# Patient Record
Sex: MALE
[De-identification: names, ages, dates, MRNs, and addresses within clinical notes are randomized per-mention and may not be internally consistent; named-entity substitution may affect disease eponyms.]

---

## 2018-08-27 ENCOUNTER — APPOINTMENT (OUTPATIENT)
Dept: ENDOCRINOLOGY | Facility: CLINIC | Age: 48
End: 2018-08-27
Payer: MEDICAID

## 2018-08-27 VITALS
BODY MASS INDEX: 26.68 KG/M2 | DIASTOLIC BLOOD PRESSURE: 80 MMHG | WEIGHT: 197 LBS | SYSTOLIC BLOOD PRESSURE: 120 MMHG | HEART RATE: 67 BPM | HEIGHT: 72 IN

## 2018-08-27 PROBLEM — Z00.00 ENCOUNTER FOR PREVENTIVE HEALTH EXAMINATION: Status: ACTIVE | Noted: 2018-08-27

## 2018-08-27 PROCEDURE — 99203 OFFICE O/P NEW LOW 30 MIN: CPT

## 2018-10-25 ENCOUNTER — APPOINTMENT (OUTPATIENT)
Dept: HEART AND VASCULAR | Facility: CLINIC | Age: 48
End: 2018-10-25

## 2018-11-08 ENCOUNTER — APPOINTMENT (OUTPATIENT)
Dept: HEART AND VASCULAR | Facility: CLINIC | Age: 48
End: 2018-11-08

## 2018-11-26 ENCOUNTER — APPOINTMENT (OUTPATIENT)
Dept: ENDOCRINOLOGY | Facility: CLINIC | Age: 48
End: 2018-11-26

## 2018-11-29 ENCOUNTER — APPOINTMENT (OUTPATIENT)
Dept: HEART AND VASCULAR | Facility: CLINIC | Age: 48
End: 2018-11-29
Payer: MEDICAID

## 2018-11-29 VITALS
SYSTOLIC BLOOD PRESSURE: 100 MMHG | WEIGHT: 205 LBS | HEIGHT: 72 IN | DIASTOLIC BLOOD PRESSURE: 70 MMHG | RESPIRATION RATE: 16 BRPM | BODY MASS INDEX: 27.77 KG/M2 | HEART RATE: 70 BPM

## 2018-11-29 PROCEDURE — 99213 OFFICE O/P EST LOW 20 MIN: CPT

## 2018-11-29 NOTE — HISTORY OF PRESENT ILLNESS
[FreeTextEntry1] : Patient is 48 year old male without medical history who was seen today for follow up. Previously complained of intermittent episodes of LE edema. Currently reports no LE edema. Remains very active, goes to gym without any chest pain/shortness of breath. No dizziness/palpitations/LOC.

## 2018-11-29 NOTE — PHYSICAL EXAM
[General Appearance - Well Developed] : well developed [Normal Appearance] : normal appearance [Well Groomed] : well groomed [General Appearance - Well Nourished] : well nourished [No Deformities] : no deformities [General Appearance - In No Acute Distress] : no acute distress [Normal Conjunctiva] : the conjunctiva exhibited no abnormalities [Eyelids - No Xanthelasma] : the eyelids demonstrated no xanthelasmas [Normal Oral Mucosa] : normal oral mucosa [No Oral Pallor] : no oral pallor [No Oral Cyanosis] : no oral cyanosis [Normal Jugular Venous A Waves Present] : normal jugular venous A waves present [Normal Jugular Venous V Waves Present] : normal jugular venous V waves present [No Jugular Venous Hogan A Waves] : no jugular venous hogan A waves [Respiration, Rhythm And Depth] : normal respiratory rhythm and effort [Exaggerated Use Of Accessory Muscles For Inspiration] : no accessory muscle use [Auscultation Breath Sounds / Voice Sounds] : lungs were clear to auscultation bilaterally [Heart Rate And Rhythm] : heart rate and rhythm were normal [Heart Sounds] : normal S1 and S2 [Murmurs] : no murmurs present [Abdomen Soft] : soft [Abdomen Tenderness] : non-tender [Abdomen Mass (___ Cm)] : no abdominal mass palpated [Abnormal Walk] : normal gait [Gait - Sufficient For Exercise Testing] : the gait was sufficient for exercise testing [Nail Clubbing] : no clubbing of the fingernails [Cyanosis, Localized] : no localized cyanosis [Petechial Hemorrhages (___cm)] : no petechial hemorrhages [Skin Color & Pigmentation] : normal skin color and pigmentation [] : no rash [No Venous Stasis] : no venous stasis [Skin Lesions] : no skin lesions [No Skin Ulcers] : no skin ulcer [No Xanthoma] : no  xanthoma was observed [Oriented To Time, Place, And Person] : oriented to person, place, and time [Affect] : the affect was normal [Mood] : the mood was normal [No Anxiety] : not feeling anxious

## 2018-11-29 NOTE — DISCUSSION/SUMMARY
[FreeTextEntry1] : Assessment/Plan:\par \par Patient is 48 year old male without medical history who was seen today for follow up;\par \par -Doing well, without anginal equivalents at rest or exertion\par -No LE edema noted\par -ECHO: ef normal, Venous doppler: neg for DVT, + superficial venous insuff\par -BP controlled\par \par Cardiac status stable

## 2018-12-03 ENCOUNTER — APPOINTMENT (OUTPATIENT)
Dept: ENDOCRINOLOGY | Facility: CLINIC | Age: 48
End: 2018-12-03

## 2018-12-10 ENCOUNTER — APPOINTMENT (OUTPATIENT)
Dept: ENDOCRINOLOGY | Facility: CLINIC | Age: 48
End: 2018-12-10

## 2018-12-14 ENCOUNTER — APPOINTMENT (OUTPATIENT)
Dept: ENDOCRINOLOGY | Facility: CLINIC | Age: 48
End: 2018-12-14

## 2019-02-11 ENCOUNTER — APPOINTMENT (OUTPATIENT)
Dept: ENDOCRINOLOGY | Facility: CLINIC | Age: 49
End: 2019-02-11

## 2019-04-01 ENCOUNTER — APPOINTMENT (OUTPATIENT)
Dept: ENDOCRINOLOGY | Facility: CLINIC | Age: 49
End: 2019-04-01
Payer: SELF-PAY

## 2019-04-01 DIAGNOSIS — E04.1 NONTOXIC SINGLE THYROID NODULE: ICD-10-CM

## 2019-04-01 PROCEDURE — 99214 OFFICE O/P EST MOD 30 MIN: CPT

## 2019-04-01 NOTE — HISTORY OF PRESENT ILLNESS
[FreeTextEntry1] : 8/2018 tt4 low normal at 4.4, other labs wnl.\par \par 4/2019: Here for /fu, generally feels well and endorses no acute complaints. No interval events since LV. Today reports no new complaints. completed US and thyroid labs which show normal TFTs and no nodules. he otherwise denies any f/c, CP, SOB, palpitations, tremors, depressed mood, anxiety, palpitations, n/v, stool/urinary abn, skin/weight changes, heat/cold intolerance, HAs, breast/nipple changes, polyuria/polydipsia/nocturia or other complaints. He again denies any dysphagia, hoarseness, neck tenderness or new palpable masses. He again denies any family history of thyroid disorders or personal exposure to ionizing radiation.\par reports chronic low libido, is worried about low testosterone, reports that PM testosterone was 230 in the past. \par

## 2019-04-01 NOTE — ASSESSMENT
[FreeTextEntry1] : Thyroid complaints\par no nodule identified, 11/2018 TFTs wnl, complete w/u w/ TPO Ab measurement prior to NV\par \par Low libido\par appears appropriately virilized, check HPG axis at 8 AM. Verbalized understanding and agrees with treatment plan, will contact MD and seek emergency medical care if condition changes.\par

## 2019-06-10 ENCOUNTER — APPOINTMENT (OUTPATIENT)
Dept: ENDOCRINOLOGY | Facility: CLINIC | Age: 49
End: 2019-06-10

## 2019-09-03 ENCOUNTER — APPOINTMENT (OUTPATIENT)
Dept: ENDOCRINOLOGY | Facility: CLINIC | Age: 49
End: 2019-09-03

## 2021-04-26 ENCOUNTER — APPOINTMENT (OUTPATIENT)
Dept: ENDOCRINOLOGY | Facility: CLINIC | Age: 51
End: 2021-04-26
Payer: COMMERCIAL

## 2021-04-26 DIAGNOSIS — R79.89 OTHER SPECIFIED ABNORMAL FINDINGS OF BLOOD CHEMISTRY: ICD-10-CM

## 2021-04-26 DIAGNOSIS — E22.1 HYPERPROLACTINEMIA: ICD-10-CM

## 2021-04-26 PROCEDURE — 99215 OFFICE O/P EST HI 40 MIN: CPT

## 2021-04-26 NOTE — ASSESSMENT
[FreeTextEntry1] : Thyroid complaints\par no nodule identified, 11/2018 TFTs wnl, complete w/u w/ TPO Ab measurement prior to NV\par \par Low libido\par appears appropriately virilized, check HPG axis at 8 AM. Verbalized understanding and agrees with treatment plan, will contact MD and seek emergency medical care if condition changes.\par \par preDM2\par A1C 5/9%, reviewed dietary modifications\par \par hyperprolactinemia\par TTest at 500, fT wnl, no s/s of sellar mass effect, PRL diluted 1:100 at 40, taking many "hormonal supplements" advised to stop and we will reassess early AM. Verbalized understanding and agrees with treatment plan, will contact MD and seek emergency medical care if condition changes.\par \par

## 2021-04-26 NOTE — HISTORY OF PRESENT ILLNESS
[FreeTextEntry1] : 8/2018 tt4 low normal at 4.4, other labs wnl.\par \par 4/2021: Here to re establish care, lost to f/u x 2 years, generally feels well and endorses no acute complaints. No interval events since LV. Today reports no new complaints. completed US and thyroid labs which show normal TFTs and no nodules. he otherwise denies any f/c, CP, SOB, palpitations, tremors, depressed mood, anxiety, palpitations, n/v, stool/urinary abn, skin/weight changes, heat/cold intolerance, HAs, breast/nipple changes, polyuria/polydipsia/nocturia or other complaints. He again denies any dysphagia, hoarseness, neck tenderness or new palpable masses. He again denies any family history of thyroid disorders or personal exposure to ionizing radiation.\par reports chronic low libido, is worried about low testosterone, reports that PM testosterone was 230 in the past. \par

## 2021-07-19 ENCOUNTER — APPOINTMENT (OUTPATIENT)
Dept: ENDOCRINOLOGY | Facility: CLINIC | Age: 51
End: 2021-07-19
Payer: COMMERCIAL

## 2021-07-19 VITALS
WEIGHT: 213 LBS | DIASTOLIC BLOOD PRESSURE: 70 MMHG | SYSTOLIC BLOOD PRESSURE: 110 MMHG | BODY MASS INDEX: 28.85 KG/M2 | HEART RATE: 58 BPM | HEIGHT: 72 IN

## 2021-07-19 DIAGNOSIS — R53.82 CHRONIC FATIGUE, UNSPECIFIED: ICD-10-CM

## 2021-07-19 DIAGNOSIS — R73.03 PREDIABETES.: ICD-10-CM

## 2021-07-19 DIAGNOSIS — R68.82 DECREASED LIBIDO: ICD-10-CM

## 2021-07-19 PROCEDURE — 99214 OFFICE O/P EST MOD 30 MIN: CPT

## 2021-07-19 NOTE — ASSESSMENT
[FreeTextEntry1] : Thyroid complaints\par no nodule identified, 11/2018 TFTs wnl, complete w/u w/ TPO Ab measurement prior to NV\par \par Low libido\par appears appropriately virilized, check HPG axis at 8 AM. Verbalized understanding and agrees with treatment plan, will contact MD and seek emergency medical care if condition changes.\par \par preDM2\par A1C 5/9%, reviewed dietary modifications\par \par hyperprolactinemia\par TTest at 500, fT wnl, no s/s of sellar mass effect, PRL diluted 1:100 at 1, prior mild elevation resolved after cessation of many "hormonal supplements" advised to stop indefinitely. Verbalized understanding and agrees with treatment plan, will contact MD and seek emergency medical care if condition changes.\par \par

## 2021-07-19 NOTE — HISTORY OF PRESENT ILLNESS
[FreeTextEntry1] : 8/2018 tt4 low normal at 4.4, other labs wnl.\par \par 7/2021: Here to re establish care, lost to f/u x 2 years, generally feels well and endorses no acute complaints. No interval events since LV. Today reports no new complaints. completed US and thyroid labs which show normal TFTs and no nodules. he otherwise denies any f/c, CP, SOB, palpitations, tremors, depressed mood, anxiety, palpitations, n/v, stool/urinary abn, skin/weight changes, heat/cold intolerance, HAs, breast/nipple changes, polyuria/polydipsia/nocturia or other complaints. He again denies any dysphagia, hoarseness, neck tenderness or new palpable masses. He again denies any family history of thyroid disorders or personal exposure to ionizing radiation.\par reports chronic low libido, is worried about low testosterone, reports that PM testosterone was 230 in the past. \par

## 2022-07-25 ENCOUNTER — APPOINTMENT (OUTPATIENT)
Dept: ENDOCRINOLOGY | Facility: CLINIC | Age: 52
End: 2022-07-25